# Patient Record
Sex: FEMALE | Race: OTHER | HISPANIC OR LATINO | Employment: FULL TIME | URBAN - METROPOLITAN AREA
[De-identification: names, ages, dates, MRNs, and addresses within clinical notes are randomized per-mention and may not be internally consistent; named-entity substitution may affect disease eponyms.]

---

## 2021-12-02 ENCOUNTER — ANNUAL EXAM (OUTPATIENT)
Dept: OBGYN CLINIC | Facility: CLINIC | Age: 46
End: 2021-12-02
Payer: COMMERCIAL

## 2021-12-02 VITALS
BODY MASS INDEX: 22.82 KG/M2 | SYSTOLIC BLOOD PRESSURE: 100 MMHG | WEIGHT: 124 LBS | DIASTOLIC BLOOD PRESSURE: 60 MMHG | HEIGHT: 62 IN

## 2021-12-02 DIAGNOSIS — Z01.419 ROUTINE GYNECOLOGICAL EXAMINATION: Primary | ICD-10-CM

## 2021-12-02 DIAGNOSIS — Z12.31 ENCOUNTER FOR SCREENING MAMMOGRAM FOR MALIGNANT NEOPLASM OF BREAST: ICD-10-CM

## 2021-12-02 DIAGNOSIS — Z12.4 SCREENING FOR MALIGNANT NEOPLASM OF THE CERVIX: ICD-10-CM

## 2021-12-02 PROCEDURE — 99396 PREV VISIT EST AGE 40-64: CPT | Performed by: OBSTETRICS & GYNECOLOGY

## 2021-12-02 RX ORDER — ASCORBIC ACID 500 MG
POWDER IN PACKET (EA) ORAL
COMMUNITY

## 2021-12-02 RX ORDER — OMEPRAZOLE 20 MG/1
CAPSULE, DELAYED RELEASE ORAL
COMMUNITY

## 2021-12-02 RX ORDER — CHOLECALCIFEROL (VITAMIN D3) 10(400)/ML
400 DROPS ORAL DAILY
COMMUNITY

## 2021-12-02 RX ORDER — BUSPIRONE HYDROCHLORIDE 7.5 MG/1
TABLET ORAL
COMMUNITY

## 2021-12-07 LAB
CLINICAL INFO: NORMAL
CYTO CVX: NORMAL
DATE PREVIOUS BX: NORMAL
HPV E6+E7 MRNA CVX QL NAA+PROBE: NOT DETECTED
LMP START DATE: NORMAL
SL AMB PREV. PAP:: NORMAL
SPECIMEN SOURCE CVX/VAG CYTO: NORMAL

## 2022-12-06 ENCOUNTER — ANNUAL EXAM (OUTPATIENT)
Dept: OBGYN CLINIC | Facility: CLINIC | Age: 47
End: 2022-12-06

## 2022-12-06 VITALS
SYSTOLIC BLOOD PRESSURE: 98 MMHG | DIASTOLIC BLOOD PRESSURE: 64 MMHG | BODY MASS INDEX: 26.13 KG/M2 | WEIGHT: 142 LBS | HEIGHT: 62 IN

## 2022-12-06 DIAGNOSIS — Z01.419 ROUTINE GYNECOLOGICAL EXAMINATION: Primary | ICD-10-CM

## 2022-12-06 DIAGNOSIS — Z12.31 ENCOUNTER FOR SCREENING MAMMOGRAM FOR MALIGNANT NEOPLASM OF BREAST: ICD-10-CM

## 2022-12-06 NOTE — PROGRESS NOTES
[address], [address], [facility], [address] [facility]    ASSESSMENT/PLAN: Damien Saucedo is a 50 y o  [id number] who presents for annual gynecologic exam     Encounter for routine gynecologic examination  - Routine well woman exam completed today  - Cervical Cancer Screening: Current ASCCP Guidelines reviewed  Last Pap: 12/02/2021   Next Pap Due: 2024  - HPV Vaccination status: Not immunized  - Contraceptive counseling discussed  Current contraception: Raynette Korean IUD since 1/2019  - Breast Cancer Screening: Last Mammogram 12/10/2021, scheduled for next week  - Colorectal cancer screening was not ordered  Done 11/2022  - The following were reviewed in today's visit: breast self exam, mammography screening ordered, family planning choices and menopause    Additional problems addressed during this visit:  1  Routine gynecological examination    2  Encounter for screening mammogram for malignant neoplasm of breast  -     Mammo screening bilateral w 3d & cad; Future        CC:  Annual Gynecologic Examination    HPI: Damien Saucedo is a 50 y o  [id number] who presents for annual gynecologic examination  HPI    The following portions of the patient's history were reviewed and updated as appropriate: She  has a past medical history of Anxiety, Asthma, and Pre-diabetes  She  has a past surgical history that includes Abdominal surgery; AUGMENTATION BREAST; Cholecystectomy; Ectopic pregnancy surgery; INSERTION OF INTRAUTERINE DEVICE (IUD) (01/2019); and Mammo (historical) (Bilateral, 2020)  Her family history includes Diabetes in her father; Heart disease in her mother; Hyperlipidemia in her father, maternal grandfather, maternal grandmother, mother, paternal grandfather, and paternal grandmother; Hypertension in her mother; Ovarian cancer in her cousin and paternal aunt  She  reports that she has never smoked   She has never used smokeless tobacco  She reports current alcohol use of about 2 0 standard drinks per week  She reports that she does not use drugs  Current Outpatient Medications   Medication Sig Dispense Refill   • Ascorbic Acid (Vitamin C) 500 MG PACK      • cholecalciferol (VITAMIN D) 400 units/1 mL Take 400 Units by mouth daily     • Cyanocobalamin (Vitamin B 12) 100 MCG LOZG      • sertraline (ZOLOFT) 50 mg tablet        No current facility-administered medications for this visit  She has No Known Allergies       Review of Systems      Objective:  BP 98/64 (BP Location: Left arm, Patient Position: Sitting, Cuff Size: Standard)   Ht 5' 1 75" (1 568 m)   Wt 64 4 kg (142 lb)   Breastfeeding No   BMI 26 18 kg/m²    Physical Exam      PE:  General Appearance: alert and oriented, in no acute distress  HEENT: PERRL, thyroid without masses or tenderness  Breast: No masses, tenderness, skin changes, nipple D/C or axillary or supraclavicular adenopathy  Implants present  Abdomen: Soft, non-tender, non-distended, no masses, no rebound or guarding  Pelvic:       External genitalia: Normal appearance, no abnormal pigmentation, no lesions or masses  Normal Bartholin's and Archbold's  Urinary system: Urethral meatus normal, bladder non-tender  Vaginal: normal mucosa without prolapse or lesions  Normal-appearing physiologic discharge      Cervix: Normal-appearing, well-epithelialized, no gross lesions or masses No cervical motion tenderness  String noted      Adnexa: No adnexal masses or tenderness noted  Uterus: Normal-sized, regular contour, midline, mobile, no uterine tenderness  Extremities: Normal range of motion     Skin: normal, no rash or abnormalities  Neurologic: alert, oriented x3  Psychiatric: Appropriate affect, mood stable, cooperative with exam

## 2023-12-12 ENCOUNTER — ANNUAL EXAM (OUTPATIENT)
Dept: OBGYN CLINIC | Facility: CLINIC | Age: 48
End: 2023-12-12
Payer: COMMERCIAL

## 2023-12-12 VITALS
WEIGHT: 134 LBS | SYSTOLIC BLOOD PRESSURE: 106 MMHG | HEIGHT: 62 IN | BODY MASS INDEX: 24.66 KG/M2 | DIASTOLIC BLOOD PRESSURE: 70 MMHG

## 2023-12-12 DIAGNOSIS — Z12.31 ENCOUNTER FOR SCREENING MAMMOGRAM FOR MALIGNANT NEOPLASM OF BREAST: ICD-10-CM

## 2023-12-12 DIAGNOSIS — Z01.419 ROUTINE GYNECOLOGICAL EXAMINATION: Primary | ICD-10-CM

## 2023-12-12 PROCEDURE — S0612 ANNUAL GYNECOLOGICAL EXAMINA: HCPCS | Performed by: OBSTETRICS & GYNECOLOGY

## 2023-12-12 RX ORDER — LEVONORGESTREL 52 MG/1
INTRAUTERINE DEVICE INTRAUTERINE
COMMUNITY
Start: 2019-01-02

## 2023-12-12 NOTE — PROGRESS NOTES
[address] [place] [address], [address], [facility], [address] [facility],    ASSESSMENT/PLAN: Luis Schwartz is a 46 y.o. [id number] who presents for annual gynecologic exam.    Encounter for routine gynecologic examination  - Routine well woman exam completed today. - Cervical Cancer Screening: Current ASCCP Guidelines reviewed. Last Pap: 12/02/2021. Next Pap Due: 2024  - HPV Vaccination status: Not immunized  - Contraceptive counseling discussed. Current contraception: Uzbek Chihuahua IUD since 1/2019  - Breast Cancer Screening: Last Mammogram 12/14/2022, ordered  - Colorectal cancer screening was not ordered. - The following were reviewed in today's visit: breast self exam, mammography screening ordered, family planning choices, and menopause    Additional problems addressed during this visit:  1. Routine gynecological examination    2. Encounter for screening mammogram for malignant neoplasm of breast  -     Mammo screening bilateral w 3d & cad; Future        CC:  Annual Gynecologic Examination    HPI: Luis Schwartz is a 46 y.o. [id number] who presents for annual gynecologic examination. HPI    The following portions of the patient's history were reviewed and updated as appropriate: She  has a past medical history of Anxiety, Asthma, and Pre-diabetes. She  has a past surgical history that includes Abdominal surgery; AUGMENTATION BREAST; Cholecystectomy; Ectopic pregnancy surgery; INSERTION OF INTRAUTERINE DEVICE (IUD) (01/2019); and Mammo (historical) (Bilateral, 2020). Her family history includes Diabetes in her father; Heart disease in her mother; Hyperlipidemia in her father, maternal grandfather, maternal grandmother, mother, paternal grandfather, and paternal grandmother; Hypertension in her mother; Ovarian cancer in her cousin and paternal aunt. She  reports that she has never smoked.  She has never used smokeless tobacco. She reports current alcohol use of about 2.0 standard drinks of alcohol per week. She reports that she does not use drugs. Current Outpatient Medications   Medication Sig Dispense Refill    Ascorbic Acid (Vitamin C) 500 MG PACK       cholecalciferol (VITAMIN D) 400 units/1 mL Take 400 Units by mouth daily      Cyanocobalamin (Vitamin B 12) 100 MCG LOZG       Levonorgestrel (Liletta, 52 MG,) 20.1 MCG/DAY IUD by Intrauterine route      sertraline (ZOLOFT) 50 mg tablet        No current facility-administered medications for this visit. She has No Known Allergies. .    Review of Systems      Objective:  /70 (BP Location: Left arm, Patient Position: Sitting, Cuff Size: Standard)   Ht 5' 2" (1.575 m)   Wt 60.8 kg (134 lb)   BMI 24.51 kg/m²    Physical Exam      PE:  General Appearance: alert and oriented, in no acute distress. HEENT: PERRL, thyroid without masses or tenderness  Breast: No masses, tenderness, skin changes, nipple D/C or axillary or supraclavicular adenopathy  Abdomen: Soft, non-tender, non-distended, no masses, no rebound or guarding. Pelvic:       External genitalia: Normal appearance, no abnormal pigmentation, no lesions or masses. Normal Bartholin's and Metaline's. Urinary system: Urethral meatus normal, bladder non-tender. Vaginal: normal mucosa without prolapse or lesions. Normal-appearing physiologic discharge      Cervix: Normal-appearing, well-epithelialized, no gross lesions or masses No cervical motion tenderness. String in os      Adnexa: No adnexal masses or tenderness noted. Uterus: Normal-sized, regular contour, midline, mobile, no uterine tenderness. Extremities: Normal range of motion.    Skin: normal, no rash or abnormalities  Neurologic: alert, oriented x3  Psychiatric: Appropriate affect, mood stable, cooperative with exam.

## 2024-02-13 DIAGNOSIS — Z12.31 ENCOUNTER FOR SCREENING MAMMOGRAM FOR MALIGNANT NEOPLASM OF BREAST: ICD-10-CM

## 2024-12-17 ENCOUNTER — ANNUAL EXAM (OUTPATIENT)
Dept: OBGYN CLINIC | Facility: CLINIC | Age: 49
End: 2024-12-17
Payer: COMMERCIAL

## 2024-12-17 VITALS
BODY MASS INDEX: 24.84 KG/M2 | WEIGHT: 135 LBS | SYSTOLIC BLOOD PRESSURE: 100 MMHG | HEIGHT: 62 IN | DIASTOLIC BLOOD PRESSURE: 62 MMHG

## 2024-12-17 DIAGNOSIS — Z12.31 ENCOUNTER FOR SCREENING MAMMOGRAM FOR MALIGNANT NEOPLASM OF BREAST: ICD-10-CM

## 2024-12-17 DIAGNOSIS — Z01.419 ROUTINE GYNECOLOGICAL EXAMINATION: Primary | ICD-10-CM

## 2024-12-17 PROCEDURE — S0612 ANNUAL GYNECOLOGICAL EXAMINA: HCPCS | Performed by: OBSTETRICS & GYNECOLOGY

## 2024-12-17 NOTE — PROGRESS NOTES
[facility] - [address], [address], [place] [address]    ASSESSMENT/PLAN: Dorie Alejandro is a 49 y.o.  who presents for annual gynecologic exam.    Encounter for routine gynecologic examination  - Routine well woman exam completed today.  - Cervical Cancer Screening: Current ASCCP Guidelines reviewed. Last Pap: 2021. Next Pap Due:   - HPV Vaccination status: Not immunized  - Contraceptive counseling discussed.  Current contraception: Liletta IUD since 2019  - Breast Cancer Screening: Last Mammogram 2023, ordered  - Colorectal cancer screening was not ordered.  - The following were reviewed in today's visit: breast self exam, mammography screening ordered, family planning choices, and menopause    Additional problems addressed during this visit:  1. Routine gynecological examination  2. Encounter for screening mammogram for malignant neoplasm of breast  -     Mammo screening bilateral w 3d and cad; Future      CC:  Annual Gynecologic Examination    HPI: Dorie Alejandro is a 49 y.o.  who presents for annual gynecologic examination.  HPI    The following portions of the patient's history were reviewed and updated as appropriate: She  has a past medical history of Anxiety, Asthma, and Pre-diabetes.  She  has a past surgical history that includes Abdominal surgery; AUGMENTATION BREAST; Cholecystectomy; Ectopic pregnancy surgery; INSERTION OF INTRAUTERINE DEVICE (IUD) (2019); and Mammo (historical) (Bilateral, ).  Her family history includes Diabetes in her father; Heart disease in her mother; Hyperlipidemia in her father, maternal grandfather, maternal grandmother, mother, paternal grandfather, and paternal grandmother; Hypertension in her mother; Ovarian cancer in her cousin and paternal aunt.  She  reports that she has never smoked. She has never used smokeless tobacco. She reports current alcohol use of about 2.0 standard drinks of alcohol per  "week. She reports that she does not use drugs.  Current Outpatient Medications   Medication Sig Dispense Refill    cholecalciferol (VITAMIN D) 400 units/1 mL Take 400 Units by mouth daily      Cyanocobalamin (Vitamin B 12) 100 MCG LOZG       Levonorgestrel (Liletta, 52 MG,) 20.1 MCG/DAY IUD by Intrauterine route      sertraline (ZOLOFT) 50 mg tablet       Ascorbic Acid (Vitamin C) 500 MG PACK        No current facility-administered medications for this visit.     She has no known allergies..    Review of Systems      Objective:  /62 (BP Location: Right arm, Patient Position: Sitting, Cuff Size: Standard)   Ht 5' 1.75\" (1.568 m)   Wt 61.2 kg (135 lb)   BMI 24.89 kg/m²    Physical Exam      PE:  General Appearance: alert and oriented, in no acute distress.   HEENT: PERRL, thyroid without masses or tenderness  Breast: No masses, tenderness, skin changes, nipple D/C or axillary or supraclavicular adenopathy  Abdomen: Soft, non-tender, non-distended, no masses, no rebound or guarding.  Pelvic:       External genitalia: Normal appearance, no abnormal pigmentation, no lesions or masses. Normal Bartholin's and Bulger's.      Urinary system: Urethral meatus normal, bladder non-tender.      Vaginal: normal mucosa without prolapse or lesions. Normal-appearing physiologic discharge      Cervix: Normal-appearing, well-epithelialized, no gross lesions or masses No cervical motion tenderness. String in os      Adnexa: No adnexal masses or tenderness noted.      Uterus: Normal-sized, regular contour, midline, mobile, no uterine tenderness.  Extremities: Normal range of motion.   Skin: normal, no rash or abnormalities  Neurologic: alert, oriented x3  Psychiatric: Appropriate affect, mood stable, cooperative with exam.  "